# Patient Record
Sex: MALE | Race: WHITE | Employment: OTHER | URBAN - METROPOLITAN AREA
[De-identification: names, ages, dates, MRNs, and addresses within clinical notes are randomized per-mention and may not be internally consistent; named-entity substitution may affect disease eponyms.]

---

## 2022-04-21 ENCOUNTER — APPOINTMENT (OUTPATIENT)
Dept: GENERAL RADIOLOGY | Age: 52
End: 2022-04-21
Attending: EMERGENCY MEDICINE
Payer: COMMERCIAL

## 2022-04-21 ENCOUNTER — HOSPITAL ENCOUNTER (EMERGENCY)
Age: 52
Discharge: HOME | End: 2022-04-21
Attending: EMERGENCY MEDICINE
Payer: COMMERCIAL

## 2022-04-21 VITALS
HEART RATE: 77 BPM | OXYGEN SATURATION: 99 % | TEMPERATURE: 98.5 F | SYSTOLIC BLOOD PRESSURE: 125 MMHG | DIASTOLIC BLOOD PRESSURE: 82 MMHG | RESPIRATION RATE: 16 BRPM

## 2022-04-21 DIAGNOSIS — R07.9 CHEST PAIN, UNSPECIFIED TYPE: Primary | ICD-10-CM

## 2022-04-21 LAB
ALBUMIN SERPL-MCNC: 4 G/DL (ref 3.5–5)
ALBUMIN/GLOB SERPL: 1.2 {RATIO} (ref 1.1–2.2)
ALP SERPL-CCNC: 69 U/L (ref 45–117)
ALT SERPL-CCNC: 48 U/L (ref 12–78)
ANION GAP SERPL CALC-SCNC: 7 MMOL/L (ref 5–15)
AST SERPL-CCNC: 30 U/L (ref 15–37)
BASOPHILS # BLD: 0.1 K/UL (ref 0–0.1)
BASOPHILS NFR BLD: 1 % (ref 0–1)
BILIRUB SERPL-MCNC: 0.7 MG/DL (ref 0.2–1)
BUN SERPL-MCNC: 17 MG/DL (ref 6–20)
BUN/CREAT SERPL: 20 (ref 12–20)
CALCIUM SERPL-MCNC: 9.2 MG/DL (ref 8.5–10.1)
CHLORIDE SERPL-SCNC: 105 MMOL/L (ref 97–108)
CO2 SERPL-SCNC: 25 MMOL/L (ref 21–32)
COMMENT, HOLDF: NORMAL
CREAT SERPL-MCNC: 0.84 MG/DL (ref 0.7–1.3)
D DIMER PPP FEU-MCNC: 0.34 MG/L FEU (ref 0–0.65)
DIFFERENTIAL METHOD BLD: NORMAL
EOSINOPHIL # BLD: 0.1 K/UL (ref 0–0.4)
EOSINOPHIL NFR BLD: 2 % (ref 0–7)
ERYTHROCYTE [DISTWIDTH] IN BLOOD BY AUTOMATED COUNT: 11.6 % (ref 11.5–14.5)
GLOBULIN SER CALC-MCNC: 3.3 G/DL (ref 2–4)
GLUCOSE SERPL-MCNC: 102 MG/DL (ref 65–100)
HCT VFR BLD AUTO: 40.5 % (ref 36.6–50.3)
HGB BLD-MCNC: 14 G/DL (ref 12.1–17)
IMM GRANULOCYTES # BLD AUTO: 0 K/UL (ref 0–0.04)
IMM GRANULOCYTES NFR BLD AUTO: 0 % (ref 0–0.5)
LYMPHOCYTES # BLD: 1.9 K/UL (ref 0.8–3.5)
LYMPHOCYTES NFR BLD: 28 % (ref 12–49)
MCH RBC QN AUTO: 32.4 PG (ref 26–34)
MCHC RBC AUTO-ENTMCNC: 34.6 G/DL (ref 30–36.5)
MCV RBC AUTO: 93.8 FL (ref 80–99)
MONOCYTES # BLD: 0.6 K/UL (ref 0–1)
MONOCYTES NFR BLD: 8 % (ref 5–13)
NEUTS SEG # BLD: 4.1 K/UL (ref 1.8–8)
NEUTS SEG NFR BLD: 61 % (ref 32–75)
NRBC # BLD: 0 K/UL (ref 0–0.01)
NRBC BLD-RTO: 0 PER 100 WBC
PLATELET # BLD AUTO: 283 K/UL (ref 150–400)
PMV BLD AUTO: 9.7 FL (ref 8.9–12.9)
POTASSIUM SERPL-SCNC: 3.4 MMOL/L (ref 3.5–5.1)
PROT SERPL-MCNC: 7.3 G/DL (ref 6.4–8.2)
RBC # BLD AUTO: 4.32 M/UL (ref 4.1–5.7)
SAMPLES BEING HELD,HOLD: NORMAL
SODIUM SERPL-SCNC: 137 MMOL/L (ref 136–145)
TROPONIN-HIGH SENSITIVITY: 6 NG/L (ref 0–76)
TROPONIN-HIGH SENSITIVITY: 6 NG/L (ref 0–76)
WBC # BLD AUTO: 6.8 K/UL (ref 4.1–11.1)

## 2022-04-21 PROCEDURE — 99285 EMERGENCY DEPT VISIT HI MDM: CPT

## 2022-04-21 PROCEDURE — 93005 ELECTROCARDIOGRAM TRACING: CPT

## 2022-04-21 PROCEDURE — 85025 COMPLETE CBC W/AUTO DIFF WBC: CPT

## 2022-04-21 PROCEDURE — 84484 ASSAY OF TROPONIN QUANT: CPT

## 2022-04-21 PROCEDURE — 36415 COLL VENOUS BLD VENIPUNCTURE: CPT

## 2022-04-21 PROCEDURE — 71046 X-RAY EXAM CHEST 2 VIEWS: CPT

## 2022-04-21 PROCEDURE — 80053 COMPREHEN METABOLIC PANEL: CPT

## 2022-04-21 PROCEDURE — 85379 FIBRIN DEGRADATION QUANT: CPT

## 2022-04-21 NOTE — ED PROVIDER NOTES
Mr. Jessica Rubio is a 52yo male who presents to the ER with complaints of chest pain. He said that he has had discomfort in his chest for the last month. He has been mostly constant throughout the day and almost every day. The pain is mild and is in his mid chest.  He said he usually feels better when he gets up or exerts himself. He also complains of some generalized weakness and mild lightheadedness. No shortness of breath. His discomfort is located in his mid chest.  He denies any radiation. No fevers or chills. No nausea or vomiting. No leg swelling. He said that he has a history of anxiety and he does not like his anxiety is well controlled on his medications. He denies any other complaints. Past Medical History:   Diagnosis Date    Anxiety     High cholesterol        Past Surgical History:   Procedure Laterality Date    HX ORTHOPAEDIC      rotator cuff         History reviewed. No pertinent family history. Social History     Socioeconomic History    Marital status: Not on file     Spouse name: Not on file    Number of children: Not on file    Years of education: Not on file    Highest education level: Not on file   Occupational History    Not on file   Tobacco Use    Smoking status: Former Smoker    Smokeless tobacco: Never Used   Substance and Sexual Activity    Alcohol use: Yes     Comment: 4 per night    Drug use: Not Currently     Types: Marijuana    Sexual activity: Yes   Other Topics Concern    Not on file   Social History Narrative    Not on file     Social Determinants of Health     Financial Resource Strain:     Difficulty of Paying Living Expenses: Not on file   Food Insecurity:     Worried About Running Out of Food in the Last Year: Not on file    Evaristo of Food in the Last Year: Not on file   Transportation Needs:     Lack of Transportation (Medical): Not on file    Lack of Transportation (Non-Medical):  Not on file   Physical Activity:     Days of Exercise per Week: Not on file    Minutes of Exercise per Session: Not on file   Stress:     Feeling of Stress : Not on file   Social Connections:     Frequency of Communication with Friends and Family: Not on file    Frequency of Social Gatherings with Friends and Family: Not on file    Attends Anabaptist Services: Not on file    Active Member of [address] [place] [address] or Organizations: Not on file    Attends Club or Organization Meetings: Not on file    Marital Status: Not on file   Intimate Partner Violence:     Fear of Current or Ex-Partner: Not on file    Emotionally Abused: Not on file    Physically Abused: Not on file    Sexually Abused: Not on file   Housing Stability:     Unable to Pay for Housing in the Last Year: Not on file    Number of [place] in the Last Year: Not on file    Unstable Housing in the Last Year: Not on file         ALLERGIES: Patient has no known allergies. Review of Systems   Constitutional: Negative for chills and fever. HENT: Negative for rhinorrhea and sore throat. Respiratory: Negative for cough and shortness of breath. Cardiovascular: Positive for chest pain. Gastrointestinal: Negative for abdominal pain, diarrhea, nausea and vomiting. Genitourinary: Negative for dysuria and hematuria. Musculoskeletal: Negative for arthralgias and myalgias. Skin: Negative for pallor and rash. Neurological: Negative for dizziness, weakness and light-headedness. All other systems reviewed and are negative. Vitals:    04/21/22 1317   BP: 124/80   Pulse: 90   Resp: 18   Temp: 98.5 °F (36.9 °C)   SpO2: 98%            Physical Exam     Vital signs reviewed. Nursing notes reviewed.     Const:  No acute distress, well developed, well nourished  Head:  Atraumatic, normocephalic  Eyes:  PERRL, conjunctiva normal, no scleral icterus  Neck:  Supple, trachea midline  Cardiovascular: Regular rate and rhythm, no murmurs,  Resp:  No resp distress, no increased work of breathing  Abd:  Soft, non-tender, non-distended, no rebound, no guarding, no CVA tenderness  MSK:  No pedal edema, normal ROM  Neuro:  Alert and oriented x3, no cranial nerve defect  Skin:  Warm, dry, intact  Psych: normal mood and affect, behavior is normal, judgement and thought content is normal          MDM  Number of Diagnoses or Management Options     Amount and/or Complexity of Data Reviewed  Clinical lab tests: ordered and reviewed  Tests in the radiology section of CPT®: ordered and reviewed  Review and summarize past medical records: yes    Patient Progress  Patient progress: stable          Mr. Daryle Stanley is a 52yo male who presents to the ER with complaints of chest pain. His sx are atypical for ACS with stable troponins. D-dimer is negative. Pt. Is well appearing and is no distress in the ER. Pt. To f/u with her PCP or return to the ER with new or worsening sx.       Procedures

## 2022-04-21 NOTE — ED TRIAGE NOTES
Pt ambulatory to triage for c/o left sided chest pain that has been constant x 1 month but worsened today. C/o bilateral leg weakness and feeling \"lightheaded\".  Denies SOB

## 2022-04-22 LAB
ATRIAL RATE: 87 BPM
CALCULATED P AXIS, ECG09: 54 DEGREES
CALCULATED R AXIS, ECG10: 43 DEGREES
CALCULATED T AXIS, ECG11: 48 DEGREES
DIAGNOSIS, 93000: NORMAL
P-R INTERVAL, ECG05: 152 MS
Q-T INTERVAL, ECG07: 354 MS
QRS DURATION, ECG06: 82 MS
QTC CALCULATION (BEZET), ECG08: 425 MS
VENTRICULAR RATE, ECG03: 87 BPM

## 2024-01-11 PROBLEM — J45.909 ASTHMA: Status: ACTIVE | Noted: 2024-01-11

## 2024-01-11 PROBLEM — K21.9 GERD (GASTROESOPHAGEAL REFLUX DISEASE): Status: ACTIVE | Noted: 2024-01-11

## 2024-01-11 PROBLEM — F32.A DEPRESSION: Status: ACTIVE | Noted: 2024-01-11

## 2024-01-22 ENCOUNTER — PROCEDURE VISIT (OUTPATIENT)
Age: 54
End: 2024-01-22
Payer: COMMERCIAL

## 2024-01-22 DIAGNOSIS — R20.0 NUMBNESS OF RIGHT FOOT: ICD-10-CM

## 2024-01-22 DIAGNOSIS — G56.03 BILATERAL CARPAL TUNNEL SYNDROME: Primary | ICD-10-CM

## 2024-01-22 DIAGNOSIS — G56.23 CUBITAL TUNNEL SYNDROME OF BOTH UPPER EXTREMITIES: ICD-10-CM

## 2024-01-22 PROCEDURE — 95886 MUSC TEST DONE W/N TEST COMP: CPT | Performed by: PSYCHIATRY & NEUROLOGY

## 2024-01-22 PROCEDURE — 95913 NRV CNDJ TEST 13/> STUDIES: CPT | Performed by: PSYCHIATRY & NEUROLOGY

## 2024-01-22 NOTE — PROGRESS NOTES
Sensory (5th Digit)  32.2 °C   Wrist    3.4 <3.7 21.3 >15.0 2.6 Wrist 5th Digit 3.4 14.0 41 >38   Right Ulnar Anti Sensory (5th Digit)  33 °C   Wrist    3.6 <3.7 15.3 >15.0 2.1 Wrist 5th Digit 3.6 14.0 39 >38     Motor Summary Table     Stim Site NR Onset (ms) Norm Onset (ms) O-P Amp (mV) Norm O-P Amp Site1 Site2 Delta-0 (ms) Dist (cm) John (m/s) Norm John (m/s)   Left Median Motor (Abd Poll Brev)  32.2 °C   Wrist    3.4 <4.2 5.6 >5 Elbow Wrist 5.0 27.0 54 >50   Elbow    8.4  5.5          Right Median Motor (Abd Poll Brev)  33 °C   Wrist    3.6 <4.2 8.5 >5 Elbow Wrist 4.9 27.0 55 >50   Elbow    8.5  7.8          Right Peroneal Motor (Ext Dig Brev)  31 °C   Ankle    3.6 <6.1 4.1 >2.5 B Fib Ankle 7.1 34.0 48 >38   B Fib    10.7  4.0  Poplt B Fib 2.1 10.0 48 >40   Poplt    12.8  3.6          Right Tibial Motor (Abd Feliz Brev)  31 °C   Ankle    4.6 <6.1 11.6 >3.0 Knee Ankle 7.9 40.0 51 >35   Knee    12.5  7.5          Left Ulnar Motor (Abd Dig Minimi)  32.2 °C   Wrist    3.3 <4.2 13.7 >3 B Elbow Wrist 3.0 18.0 60 >53   B Elbow    6.3  13.1  A Elbow B Elbow 2.5 10.0 40 >53   A Elbow    8.8  12.3          Right Ulnar Motor (Abd Dig Minimi)  33 °C   Wrist    3.7 <4.2 11.2 >3 B Elbow Wrist 2.8 18.0 64 >53   B Elbow    6.5  10.9  A Elbow B Elbow 2.3 10.0 43 >53   A Elbow    8.8  10.5            Comparison Summary Table     Stim Site NR Peak (ms) Norm Peak (ms) P-T Amp (µV) Site1 Site2 Delta-P (ms) Norm Delta (ms)   Left Median/Ulnar Palm Comparison (Wrist - 8cm)  32.2 °C   Median Palm    2.4 <2.5 50.9 Median Palm Ulnar Palm 0.4 <0.3   Ulnar Palm    2.0 <2.5 9.3       Right Median/Ulnar Palm Comparison (Wrist - 8cm)  33 °C   Median Palm    2.3 <2.5 37.1 Median Palm Ulnar Palm 0.4 <0.3   Ulnar Palm    1.9 <2.5 20.4         F Wave Studies     NR F-Lat (ms) Lat Norm (ms) L-R F-Lat (ms) L-R Lat Norm   Right Tibial (Mrkrs) (Abd Hallucis)  31 °C      51.32 <61  <5.7   Left Ulnar (Mrkrs) (Abd Dig Min)  32.2 °C      28.24 <36 0.00 <2.5